# Patient Record
Sex: MALE | Race: WHITE | NOT HISPANIC OR LATINO | URBAN - METROPOLITAN AREA
[De-identification: names, ages, dates, MRNs, and addresses within clinical notes are randomized per-mention and may not be internally consistent; named-entity substitution may affect disease eponyms.]

---

## 2021-02-22 ENCOUNTER — OFFICE VISIT (OUTPATIENT)
Dept: URBAN - METROPOLITAN AREA CLINIC 23 | Facility: CLINIC | Age: 31
End: 2021-02-22
Payer: COMMERCIAL

## 2021-02-22 ENCOUNTER — DASHBOARD ENCOUNTERS (OUTPATIENT)
Age: 31
End: 2021-02-22

## 2021-02-22 VITALS
HEART RATE: 75 BPM | DIASTOLIC BLOOD PRESSURE: 68 MMHG | BODY MASS INDEX: 25.25 KG/M2 | SYSTOLIC BLOOD PRESSURE: 128 MMHG | WEIGHT: 176.4 LBS | TEMPERATURE: 97.7 F | HEIGHT: 70 IN

## 2021-02-22 DIAGNOSIS — R19.7 DIARRHEA: ICD-10-CM

## 2021-02-22 DIAGNOSIS — K62.5 RECTAL BLEEDING: ICD-10-CM

## 2021-02-22 PROCEDURE — 99244 OFF/OP CNSLTJ NEW/EST MOD 40: CPT | Performed by: INTERNAL MEDICINE

## 2021-02-22 NOTE — HPI-TODAY'S VISIT:
- 31 yo  male, referred by Cee Plata NP for further evaluation of GI complaints as detailed below.  A copy of this note will be sent to the referring provider. - For the past 2 months he has been experiencing intermittent rectal bleeding, which he describes as bright red blood per rectum coating his stools and staining the toilet water red.  The last time he saw this was about 1 week ago. - He is never constipated.  He has loose stools about 4 times per day, "which I attribute to my healthy diet".  States this has been his bowel habit for the past 2-4 years.  Denies weight loss or fevers. - Denies family history of GI malignancies in any first degree relatives - Denies heartburn symptoms or history of chronic GERD  - States he had recent bloodwork done at his PCP's office

## 2021-03-17 ENCOUNTER — OFFICE VISIT (OUTPATIENT)
Dept: URBAN - METROPOLITAN AREA LAB 3 | Facility: LAB | Age: 31
End: 2021-03-17